# Patient Record
Sex: MALE | URBAN - METROPOLITAN AREA
[De-identification: names, ages, dates, MRNs, and addresses within clinical notes are randomized per-mention and may not be internally consistent; named-entity substitution may affect disease eponyms.]

---

## 2022-12-19 ENCOUNTER — APPOINTMENT (OUTPATIENT)
Dept: URBAN - METROPOLITAN AREA SURGERY 19 | Age: 82
Setting detail: DERMATOLOGY
End: 2022-12-19

## 2022-12-19 VITALS — DIASTOLIC BLOOD PRESSURE: 78 MMHG | HEART RATE: 51 BPM | SYSTOLIC BLOOD PRESSURE: 169 MMHG

## 2022-12-19 VITALS — TEMPERATURE: 97.7 F | SYSTOLIC BLOOD PRESSURE: 181 MMHG | HEART RATE: 56 BPM | DIASTOLIC BLOOD PRESSURE: 91 MMHG

## 2022-12-19 PROBLEM — C44.212 BASAL CELL CARCINOMA OF SKIN OF RIGHT EAR AND EXTERNAL AURICULAR CANAL: Status: ACTIVE | Noted: 2022-12-19

## 2022-12-19 PROCEDURE — 13152 CMPLX RPR E/N/E/L 2.6-7.5 CM: CPT

## 2022-12-19 PROCEDURE — 17312 MOHS ADDL STAGE: CPT

## 2022-12-19 PROCEDURE — 17311 MOHS 1 STAGE H/N/HF/G: CPT

## 2022-12-19 PROCEDURE — OTHER MOHS SURGERY: OTHER

## 2022-12-19 PROCEDURE — OTHER MIPS QUALITY: OTHER

## 2022-12-19 NOTE — PROCEDURE: MOHS SURGERY
"  Male Physical Note    Concerns today:     Sleep issue:    Wakes up    Feels tired the next day   Sleep apnea screen   S - yes   T - yes   O - yes   P - no   B - yes   A - no   N - yes   G - yes   Has been told he has \"big tonsils\" in the past - recommended to have tonsil surgery but declined in past     Headache   Onset: last week   Happens most often mid morning   Felt like a \"migraine\"   Back of head / upper neck   Feels tired   Severity: 6 or 7/10   Had to call in from work once or twice due to pain    Exacerbating factors: work (Stressed)    Starting to feel like a heavy work load   Remitting factors: ibuprofen, tylenol - completely resolves headache but still concerned he is getting them.   No nausea, vomiting, vision sx, numbness, weakness.   No chest pain, palpitations, lightheadedness, dizziness.    Weight   - Gaining weight - not sure how much but his clothes are not fitting well right now   - Diet - eats a \"traditional\" diet    Lots of rice, veggies, meat - tries to eat brown rice but a fair amount of white rice    Is on the road a lot for work and eats whatever is available - often junk food   - Hasn't been exercising much with his work and feels exhausted when he gets home     Getting up in middle of night to pee    Usually just once (if a lot of fluids, then twice)    No pain with urination, blood in urine.    No flank pain.    No hx of UTI     Tdap - last 2011   Varicella -- has 1/3. Another today. 3rd in 12 weeks.   COVID-19 (2nd??) -- first Pfizer was 7/19/21    Influenza     ROS:                      As above. Otherwise, remainder of 12 point review of systems negative.     No past medical history on file. - none that he is aware of.      No family history on file.  Reviewed no other significant FH           Family History and past Medical History reviewed and unchanged/updated.  - Depression and anx in mom   - Sister hearing loss     Social History     Tobacco Use     Smoking status: Never Smoker " "    Smokeless tobacco: Never Used   Substance Use Topics     Alcohol use: Not on file     Has anyone hurt you physically, for example by pushing, hitting, slapping or kicking you or forcing you to have sex? Denies  Do you feel threatened or controlled by a partner, ex-partner or anyone in your life? Denies    Single - not sexually active. No concerns.     Works as a  for Mental health - [employer] and [employer].      RISK BEHAVIORS AND HEALTHY HABITS:  Tobacco Use/Smoking: None  Illicit Drug Use: None  ETOH: rare - with celebrations   Sexually Active: No  Single  Diet (5-7 servings of fruits/veg daily): No - a lot of rice. Junk food when on the road.   Exercise (30 min accumulated most days):No  Dental Care: Yes   Seat Belt Use: Yes     HIV / Hep C screening: recommended, declined   Family hx of colon / breast / other cancer: none        Immunization History   Administered Date(s) Administered     COVID-19,PF,Pfizer 07/19/2021    Reviewed Immunization Record Today  Tdap - last 2011   Varicella -- has 1/3. Another today. 3rd in 12 weeks.   COVID-19 (2nd??) -- first Pfizer was 7/19/21    Influenza -- open to this today.     EXAMINATION:  /86   Pulse 96   Temp 98.4  F (36.9  C) (Oral)   Resp 24   Ht 1.563 m (5' 1.54\")   Wt 111.6 kg (246 lb)   SpO2 100%   BMI 45.68 kg/m    GENERAL: alert and no distress. Markedly obese.   EYES: Eyes grossly normal to inspection, extraocular movements - intact.   HENT: ear canals- normal; Mouth- no ulcers, no lesions. tonsills are 2-3+ bilaterally. Mallampati score 2.   NECK: increased neck circumference. No overt asymmetry. Full ROM.   RESP: no inc WOB. lungs clear to auscultation - no rales, no rhonchi, no wheezes  CV: regular rates and rhythm, normal S1 S2, no S3 or S4 and no murmur, no click or rub. Appears well-perfused.   ABDOMEN: markedly obese. soft, no tenderness, no masses, normal bowel sounds.   MS: extremities- no edema. Tight upper back / cervical / " occipital paraspinal musculature to palpation.   SKIN: no suspicious lesions, no rashes. No abdominal striae, bruising, buffalo hump.   NEURO: Fully alert and oriented. strength and tone- normal, sensory exam- grossly normal, mentation- intact, speech- normal.   - male: declined.   PSYCH: Alert and oriented times 3; speech- coherent , normal rate and volume, affect- normal. NO SI/HI.     PHQ-2 Score:     PHQ-2 ( 1999 Pfizer) 9/23/2021   Q1: Little interest or pleasure in doing things 2   Q2: Feeling down, depressed or hopeless 1   PHQ-2 Score 3     PHQ-9 score:    PHQ 9/23/2021   PHQ-9 Total Score 16   Q9: Thoughts of better off dead/self-harm past 2 weeks Not at all     ASSESSMENT/PLAN:  (Z00.00) Routine general medical examination at a health care facility  Comment: Establishing care today. Vaccines as below. Reasonable blood pressure at <140/90 today. Discussion of weight as below; open to lipid / A1c screen. Declines Hep C / HIV today. No cancer screen indicated. No significant substance use. Possible mood disorder as below.   PLAN:  - As below   - Provided age appropriate counseling as in AVS   - Will need much closer follow-up than yearly (at least about monthly for weight as well as other factors noted below)   - Has an inhaler on med list - screen for asthma / other respiratory issue at future appointment   - Hep C / HIV screens if he consents in future     (R63.5) Weight gain  (E66.01,  Z68.42) Class 3 severe obesity with serious comorbidity and body mass index (BMI) of 45.0 to 49.9 in adult, unspecified obesity type (H)  Comment: BMI markedly elevated - in range warranting bariatric surgery if he was open to it. Likely due to excess calories / sedentary lifestyle. No exam evidence of endocrinopathy as a cause for weight gain, though these will remain on the differential (cortisol excess, thyroid disorder, among others). On no drugs to cause weight gain. Recommended serologic screening and he is open to  these.   Plan:   - Needs more detailed assessment of weight at future visit (ie time course, previous weight loss attempts, detailed dietary examination, etc)   - Hemoglobin A1c  - Lipid Profile  - Consider hepatic profile / BMP to screen for hepatic / renal dz   - Initial dietary / exercise / lifestyle recommendations provided   - Recommended obesity clinic referral - he declines    Not interested in pharmacologic weight loss options at this time    Would be eligible for bariatric surgery given BMI >40   - Needs very close follow up - recommend at least monthly visits for weight to start with.    Goal ought to be minimizing weight gain at first and then target loss.     (G47.9) Sleep disorder  Comment: Suspect sleep apnea; STOPBANG elevated at 6 - high risk. Suspect nocturia is contributing - may be related to diabetes. Work stress may be contributing. Otherwise, ddx remains broad.   Plan:  - A1c as above   - Recommended sleep study    He will check with insurance and get back to us if/when he is ready    I suspect he will benefit from CPAP     (G44.209) Tension headache  Comment: Ddx most likely tension headache related to work stress and low physical activity. Could be related to sleep apnea if he indeed has this.  Does not sound suspect for migraine. No immediate history / features to suggest concerning cause otherwise.   Plan:   - Track sx more formally to discuss at future visit   - Continue PRN meds if helpful    Counseled to use more tylenol than ibuprofen   - Would rec sleep study to rule out sleep apnea as a contributor     (R35.1) Nocturia  Comment: Suspect diabetes as above, likely related. Low suspicion UTI - test at future visit if persistent / worsening sx. No concern STI. Doubt prostatic enlargement given age.   Plan:   - A1c as above  - BMP may be reasonable to screen for renal disease   - Monitor sx and follow up as needed   - Recommended fluid restriction prior to bed if able     (F39) Mood  disorder (H)  Comment: Screens positive PHQ2. PHQ9 elevated at 16. No SI. Did not have time to address today. No prior dx. Family hx depression/anxiety.   Plan:   - Will need follow up of this   - At least offer counseling / medication at future visit.     (Z71.89) Vaccine counseling    (Z23) Need for prophylactic vaccination and inoculation against influenza  (Z23) High priority for 2019-nCoV vaccine  Comment: Due for tetanus update (at 10 years), 2nd HPV, flu, 2nd COVID-19 vaccine. Discussed risks and benefits. In agreement with all today. Will need 3rd HPV in 12 weeks or more.   Plan:   - TDAP VACCINE (Adacel, Boostrix)  [[id number]]  - HPV: receives 2nd today. Needs 3rd dose to complete series in 12 weeks or beyond.    HPV9 (Gardasil 9 )  - INFLUENZA VACCINE IM > 6 MONTHS VALENT IIV4 (AFLURIA/FLUZONE)  - COVID-19,PF,PFIZER    Dino Edmond DO   [facility] Resident   Pager#[phone]      Precepted with Dr. Almazan    Area L Indication Text: Tumors in this location are included in Area L (trunk and extremities).  Mohs surgery is indicated for larger tumors, or tumors with aggressive histologic features, in these anatomic locations.

## 2022-12-19 NOTE — PROCEDURE: MOHS SURGERY
Patient advised to call if any problems, questions, or concerns. Ear Wedge Repair Text: A wedge excision was completed by carrying down an excision through the full thickness of the ear and cartilage with an inward facing Burow's triangle. The wound was then closed in a layered fashion.
